# Patient Record
Sex: MALE | Race: ASIAN | NOT HISPANIC OR LATINO | URBAN - METROPOLITAN AREA
[De-identification: names, ages, dates, MRNs, and addresses within clinical notes are randomized per-mention and may not be internally consistent; named-entity substitution may affect disease eponyms.]

---

## 2022-01-20 ENCOUNTER — EMERGENCY (EMERGENCY)
Facility: HOSPITAL | Age: 27
LOS: 1 days | Discharge: HOME | End: 2022-01-20
Attending: EMERGENCY MEDICINE | Admitting: EMERGENCY MEDICINE
Payer: MEDICAID

## 2022-01-20 VITALS
DIASTOLIC BLOOD PRESSURE: 80 MMHG | SYSTOLIC BLOOD PRESSURE: 128 MMHG | HEART RATE: 80 BPM | RESPIRATION RATE: 16 BRPM | OXYGEN SATURATION: 100 % | TEMPERATURE: 98 F

## 2022-01-20 PROCEDURE — 73562 X-RAY EXAM OF KNEE 3: CPT | Mod: 26,RT

## 2022-01-20 PROCEDURE — 99284 EMERGENCY DEPT VISIT MOD MDM: CPT

## 2022-01-20 PROCEDURE — 73552 X-RAY EXAM OF FEMUR 2/>: CPT | Mod: 26,RT

## 2022-01-20 PROCEDURE — 73590 X-RAY EXAM OF LOWER LEG: CPT | Mod: 26,RT

## 2022-01-20 NOTE — ED PROVIDER NOTE - CARE PROVIDER_API CALL
Jason Juarez)  Orthopaedic Surgery  [address] [facility], [facility] [address]  [place] [address]  Phone: [phone]  Fax: [phone]  Follow Up Time:     Nik Christianson)  [address], [facility] [address]  [place] [address]  Phone: [phone]  Fax: [phone])-  Follow Up Time:     Anatoliy Quick)  Orthopedics  [address] [place] [id number]  Phone: [phone]  Fax: [phone]  Follow Up Time:

## 2022-01-20 NOTE — ED PROVIDER NOTE - PHYSICAL EXAMINATION
GEN - NAD; well appearing; A+O x3; non-toxic appearing  CARD -s1s2, RRR, no M,G,R;   PULM - CTA b/l, symmetric breath sounds;   ABD -  +BS, ND, NT, soft, no guarding, no rebound, no masses;   BACK - no CVA tenderness, Normal  spine;   EXT - symmetric pulses, 2+ dp, capillary refill < 2 seconds, no cyanosis.  R knee swollen, tender to palpation with patellar slightly higher compared to L.  Pulses noted distally with sensation intact.    NEURO - no focal neuro deficits, no slurred speech
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
## 2022-01-20 NOTE — ED PROVIDER NOTE - PROVIDER TOKENS
PROVIDER:[TOKEN:[[id number]]],PROVIDER:[TOKEN:[[id number]]],PROVIDER:[TOKEN:[[id number]]]
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
## 2022-01-20 NOTE — ED PROVIDER NOTE - CARE PROVIDERS DIRECT ADDRESSES
[email or web address],DirectAddress_Unknown

## 2022-01-20 NOTE — ED PROVIDER NOTE - DISCHARGE DATE
Quality 431: Preventive Care And Screening: Unhealthy Alcohol Use - Screening: Patient screened for unhealthy alcohol use using a single question and scores less than 2 times per year Quality 130: Documentation Of Current Medications In The Medical Record: Current Medications Documented Quality 226: Preventive Care And Screening: Tobacco Use: Screening And Cessation Intervention: Patient screened for tobacco use and is an ex/non-smoker Detail Level: Detailed 20-Jan-2022

## 2022-01-20 NOTE — ED PROVIDER NOTE - PATIENT PORTAL LINK FT
You can access the FollowMyHealth Patient Portal offered by [facility] by registering at the following website: [email or web address]. By joining [employer]’s FollowMyHealth portal, you will also be able to view your health information using other applications (apps) compatible with our system.

## 2022-01-20 NOTE — ED PROVIDER NOTE - PROGRESS NOTE DETAILS
Discussed with orthopedics who will see patient in the office.  Pt to be placed in knee immobilizer, non-weight bearing.  No acute surgical intervention required at this time.
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
## 2022-01-25 ENCOUNTER — EMERGENCY (EMERGENCY)
Facility: HOSPITAL | Age: 27
LOS: 1 days | Discharge: HOME | End: 2022-01-25
Attending: EMERGENCY MEDICINE | Admitting: EMERGENCY MEDICINE
Payer: MEDICAID

## 2022-01-25 VITALS
DIASTOLIC BLOOD PRESSURE: 78 MMHG | RESPIRATION RATE: 18 BRPM | OXYGEN SATURATION: 100 % | TEMPERATURE: 98 F | HEART RATE: 88 BPM | SYSTOLIC BLOOD PRESSURE: 128 MMHG

## 2022-01-25 PROBLEM — Z78.9 OTHER SPECIFIED HEALTH STATUS: Chronic | Status: ACTIVE | Noted: 2022-01-20

## 2022-01-25 LAB
ALBUMIN SERPL ELPH-MCNC: 4.6 G/DL — SIGNIFICANT CHANGE UP (ref 3.3–5)
ALP SERPL-CCNC: 67 U/L — SIGNIFICANT CHANGE UP (ref 40–120)
ALT FLD-CCNC: 20 U/L — SIGNIFICANT CHANGE UP (ref 4–41)
ANION GAP SERPL CALC-SCNC: 10 MMOL/L — SIGNIFICANT CHANGE UP (ref 7–14)
AST SERPL-CCNC: 23 U/L — SIGNIFICANT CHANGE UP (ref 4–40)
BASOPHILS # BLD AUTO: 0.04 K/UL — SIGNIFICANT CHANGE UP (ref 0–0.2)
BASOPHILS NFR BLD AUTO: 0.7 % — SIGNIFICANT CHANGE UP (ref 0–2)
BILIRUB SERPL-MCNC: 0.3 MG/DL — SIGNIFICANT CHANGE UP (ref 0.2–1.2)
BUN SERPL-MCNC: 16 MG/DL — SIGNIFICANT CHANGE UP (ref 7–23)
CALCIUM SERPL-MCNC: 9.7 MG/DL — SIGNIFICANT CHANGE UP (ref 8.4–10.5)
CHLORIDE SERPL-SCNC: 96 MMOL/L — LOW (ref 98–107)
CO2 SERPL-SCNC: 27 MMOL/L — SIGNIFICANT CHANGE UP (ref 22–31)
CREAT SERPL-MCNC: 1.05 MG/DL — SIGNIFICANT CHANGE UP (ref 0.5–1.3)
EOSINOPHIL # BLD AUTO: 0.23 K/UL — SIGNIFICANT CHANGE UP (ref 0–0.5)
EOSINOPHIL NFR BLD AUTO: 4.2 % — SIGNIFICANT CHANGE UP (ref 0–6)
GLUCOSE SERPL-MCNC: 101 MG/DL — HIGH (ref 70–99)
HCT VFR BLD CALC: 42.9 % — SIGNIFICANT CHANGE UP (ref 39–50)
HGB BLD-MCNC: 14.3 G/DL — SIGNIFICANT CHANGE UP (ref 13–17)
IANC: 3.06 K/UL — SIGNIFICANT CHANGE UP (ref 1.5–8.5)
IMM GRANULOCYTES NFR BLD AUTO: 0.2 % — SIGNIFICANT CHANGE UP (ref 0–1.5)
LYMPHOCYTES # BLD AUTO: 1.61 K/UL — SIGNIFICANT CHANGE UP (ref 1–3.3)
LYMPHOCYTES # BLD AUTO: 29.1 % — SIGNIFICANT CHANGE UP (ref 13–44)
MCHC RBC-ENTMCNC: 28.2 PG — SIGNIFICANT CHANGE UP (ref 27–34)
MCHC RBC-ENTMCNC: 33.3 GM/DL — SIGNIFICANT CHANGE UP (ref 32–36)
MCV RBC AUTO: 84.6 FL — SIGNIFICANT CHANGE UP (ref 80–100)
MONOCYTES # BLD AUTO: 0.58 K/UL — SIGNIFICANT CHANGE UP (ref 0–0.9)
MONOCYTES NFR BLD AUTO: 10.5 % — SIGNIFICANT CHANGE UP (ref 2–14)
NEUTROPHILS # BLD AUTO: 3.06 K/UL — SIGNIFICANT CHANGE UP (ref 1.8–7.4)
NEUTROPHILS NFR BLD AUTO: 55.3 % — SIGNIFICANT CHANGE UP (ref 43–77)
NRBC # BLD: 0 /100 WBCS — SIGNIFICANT CHANGE UP
NRBC # FLD: 0 K/UL — SIGNIFICANT CHANGE UP
PLATELET # BLD AUTO: 238 K/UL — SIGNIFICANT CHANGE UP (ref 150–400)
POTASSIUM SERPL-MCNC: 3.6 MMOL/L — SIGNIFICANT CHANGE UP (ref 3.5–5.3)
POTASSIUM SERPL-SCNC: 3.6 MMOL/L — SIGNIFICANT CHANGE UP (ref 3.5–5.3)
PROT SERPL-MCNC: 8.2 G/DL — SIGNIFICANT CHANGE UP (ref 6–8.3)
RBC # BLD: 5.07 M/UL — SIGNIFICANT CHANGE UP (ref 4.2–5.8)
RBC # FLD: 12.7 % — SIGNIFICANT CHANGE UP (ref 10.3–14.5)
SODIUM SERPL-SCNC: 133 MMOL/L — LOW (ref 135–145)
WBC # BLD: 5.53 K/UL — SIGNIFICANT CHANGE UP (ref 3.8–10.5)
WBC # FLD AUTO: 5.53 K/UL — SIGNIFICANT CHANGE UP (ref 3.8–10.5)

## 2022-01-25 PROCEDURE — 93971 EXTREMITY STUDY: CPT | Mod: 26,LT

## 2022-01-25 PROCEDURE — 99285 EMERGENCY DEPT VISIT HI MDM: CPT

## 2022-01-25 PROCEDURE — 73590 X-RAY EXAM OF LOWER LEG: CPT | Mod: 26,RT

## 2022-01-25 PROCEDURE — 73562 X-RAY EXAM OF KNEE 3: CPT | Mod: 26,RT

## 2022-01-25 RX ORDER — OXYCODONE HYDROCHLORIDE 5 MG/1
1 TABLET ORAL
Qty: 20 | Refills: 0
Start: 2022-01-25 | End: 2022-01-29

## 2022-01-25 RX ORDER — MORPHINE SULFATE 50 MG/1
4 CAPSULE, EXTENDED RELEASE ORAL ONCE
Refills: 0 | Status: DISCONTINUED | OUTPATIENT
Start: 2022-01-25 | End: 2022-01-25

## 2022-01-25 RX ORDER — SODIUM CHLORIDE 9 MG/ML
1000 INJECTION INTRAMUSCULAR; INTRAVENOUS; SUBCUTANEOUS ONCE
Refills: 0 | Status: COMPLETED | OUTPATIENT
Start: 2022-01-25 | End: 2022-01-25

## 2022-01-25 RX ADMIN — MORPHINE SULFATE 4 MILLIGRAM(S): 50 CAPSULE, EXTENDED RELEASE ORAL at 03:48

## 2022-01-25 RX ADMIN — SODIUM CHLORIDE 1000 MILLILITER(S): 9 INJECTION INTRAMUSCULAR; INTRAVENOUS; SUBCUTANEOUS at 03:48
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
## 2022-01-25 NOTE — ED PROVIDER NOTE - PATIENT PORTAL LINK FT
You can access the FollowMyHealth Patient Portal offered by [facility] by registering at the following website: [email or web address]. By joining [employer]’s FollowMyHealth portal, you will also be able to view your health information using other applications (apps) compatible with our system.

## 2022-01-25 NOTE — ED PROVIDER NOTE - PHYSICAL EXAMINATION
Gen: NAD, AAOx3, non-toxic appearing.  HEENT: NCAT, normal conjunctiva, oral mucosa moist.  Lung: speaking in full sentences, good aeration bilaterally, lungs CTA b/l.  CV: regular rate and rhythm. cap refill <2x. peripheral pulses 2+bilaterally.   Abd: soft, ND, NT.  MSK: Diffusely tender R knee joint with nonpitting edema extending from R knee to forefoot. Sensation intact. Unable to palpate DP pulse. No skin changes, toes are not cool to touch. Range of motion of knee and ankle joint limited due to pain.   Neuro: No focal deficits.  Skin: Intact.  Psych: normal affect.

## 2022-01-25 NOTE — ED PROVIDER NOTE - PROGRESS NOTE DETAILS
Paulino, PGY2: patient reassessed, reports moderate improvement of pain. dvt study limited due to patient pain, no obvious clot. Paulino, PGY2: spoke with ortho resident. was able to palpate 2+ DP and PT pulses bilaterally. recommending ortho f/u for surgical planning. do not suspect compartment syndrome. ultrasound limited due to pain, will recommend PMD f/u for repeat study. patient in agreement with plan.
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
## 2022-01-25 NOTE — ED PROVIDER NOTE - NSFOLLOWUPINSTRUCTIONS_ED_ALL_ED_FT
You were evaluated in the Emergency Department for LEG PAIN.      There are no signs of emergency conditions requiring admission to the hospital on today's workup.      We recommend that you see a primary care physician (Call [phone]) within the next 3 days for follow up and for a repeat of the ULTRASOUND VENOUS DUPLEX to ensure that there is no clot in your leg.     We recommend that you see the ORTHOPEDIC DOCTOR within the next 3 days for follow up and for a repeat of the ULTRASOUND VENOUS DUPLEX to ensure that there is no clot in your leg.     For pain you can take ibuprofen (Motrin, Advil) or acetaminophen (Tylenol) as needed, as directed on packaging. For severe pain you may take the oxycodone which was sent to your pharmacy as prescribed.     ***Return to the emergency department if you have any other new/concerning symptoms, including but not limited to: WORSENING PAIN, NUMBNESS/TINGLING, WORSENING SWELLING***
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
## 2023-12-29 ENCOUNTER — EMERGENCY (EMERGENCY)
Facility: HOSPITAL | Age: 28
LOS: 1 days | Discharge: HOME | End: 2023-12-29
Admitting: STUDENT IN AN ORGANIZED HEALTH CARE EDUCATION/TRAINING PROGRAM
Payer: MEDICAID

## 2023-12-29 VITALS
SYSTOLIC BLOOD PRESSURE: 137 MMHG | RESPIRATION RATE: 16 BRPM | TEMPERATURE: 98 F | HEART RATE: 71 BPM | DIASTOLIC BLOOD PRESSURE: 80 MMHG | OXYGEN SATURATION: 100 %

## 2023-12-29 PROCEDURE — 99284 EMERGENCY DEPT VISIT MOD MDM: CPT

## 2023-12-29 RX ORDER — TETANUS TOXOID, REDUCED DIPHTHERIA TOXOID AND ACELLULAR PERTUSSIS VACCINE, ADSORBED 5; 2.5; 8; 8; 2.5 [IU]/.5ML; [IU]/.5ML; UG/.5ML; UG/.5ML; UG/.5ML
0.5 SUSPENSION INTRAMUSCULAR ONCE
Refills: 0 | Status: COMPLETED | OUTPATIENT
Start: 2023-12-29 | End: 2023-12-29

## 2023-12-29 RX ADMIN — Medication 1 TABLET(S): at 10:11

## 2023-12-29 RX ADMIN — TETANUS TOXOID, REDUCED DIPHTHERIA TOXOID AND ACELLULAR PERTUSSIS VACCINE, ADSORBED 0.5 MILLILITER(S): 5; 2.5; 8; 8; 2.5 SUSPENSION INTRAMUSCULAR at 10:11

## 2023-12-29 NOTE — ED PROVIDER NOTE - OBJECTIVE STATEMENT
27 y/o male with no pmhx presents to ED c/o left big toe animal bite today. Pt states he got bit by a mouse in his house. States his family members have also gotten bit over the last few months. Reported to his super. Pt reports break in skin no active bleeding at this time. Last tetanus unknown. No fevers, chills, pain, weakness, numbness, tingling.

## 2023-12-29 NOTE — ED PROVIDER NOTE - CLINICAL SUMMARY MEDICAL DECISION MAKING FREE TEXT BOX
29 y/o male with no pmhx presents to ED c/o left big toe animal bite today. Pt states he got bit by a mouse in his house. States his family members have also gotten bit over the last few months. Reported to his super. Pt reports break in skin no active bleeding at this time. Last tetanus unknown. pt with superficial puncture wound. plan to provide augmentin and update tetanus shot. 27 y/o male with no pmhx presents to ED c/o left big toe animal bite today. Pt states he got bit by a mouse in his house. States his family members have also gotten bit over the last few months. Reported to his super. Pt reports break in skin no active bleeding at this time. Last tetanus unknown. pt with superficial puncture wound. plan to provide augmentin and update tetanus shot.

## 2023-12-29 NOTE — ED ADULT NURSE NOTE - OBJECTIVE STATEMENT
Patient received to RW Zn 10. Aox4, ambulatory with steady gait. States he was sleeping and was bit by a rat this morning on his Left 1st Toe. No bleeding observed. Tiny superficial scratch observed to toe. Pt states he feels pain 8/10 at the scratch site. Pt has steady gait, no issues with ambulating. Stated the rat crawled on his face also and then he swatted it away. No injuries observed to face or head. Hx: Asthma. Denies SOB, or any other medical issues at this time. Medicated with antibx and Adacel per MD. Pt to be d/c.

## 2023-12-29 NOTE — ED PROVIDER NOTE - NSFOLLOWUPINSTRUCTIONS_ED_ALL_ED_FT
Take Augmentin as prescribed.  Keep wound clean, wash with soap and water.    Worsening, continued or new concerning symptoms, fever, chills, redness, swelling, return to the emergency department.

## 2023-12-29 NOTE — ED PROVIDER NOTE - SKIN, MLM
Skin normal color for race, warm, dry and intact. No evidence of rash. Puncture noted to plantar aspect of left big toe.

## 2023-12-29 NOTE — ED ADULT NURSE NOTE - NSFALLUNIVINTERV_ED_ALL_ED
Bed/Stretcher in lowest position, wheels locked, appropriate side rails in place/Call bell, personal items and telephone in reach/Instruct patient to call for assistance before getting out of bed/chair/stretcher/Non-slip footwear applied when patient is off stretcher/[place] to call system/Physically safe environment - no spills, clutter or unnecessary equipment/Purposeful proactive rounding/Room/bathroom lighting operational, light cord in reach Bed/Stretcher in lowest position, wheels locked, appropriate side rails in place/Call bell, personal items and telephone in reach/Instruct patient to call for assistance before getting out of bed/chair/stretcher/Non-slip footwear applied when patient is off stretcher/[place] to call system/Physically safe environment - no spills, clutter or unnecessary equipment/Purposeful proactive rounding/Room/bathroom lighting operational, light cord in reach

## 2023-12-29 NOTE — ED ADULT TRIAGE NOTE - CHIEF COMPLAINT QUOTE
pt ambulatory to walk in triage c/o left great to pain after being bitten by rat at 6am, pt states he may have also been scratched on forehead, no marks noted on forehead. med hx asthma, hold he has high blood sugar

## 2024-01-27 ENCOUNTER — EMERGENCY (EMERGENCY)
Facility: HOSPITAL | Age: 29
LOS: 1 days | Discharge: HOME | End: 2024-01-27
Admitting: EMERGENCY MEDICINE
Payer: MEDICAID

## 2024-01-27 VITALS
DIASTOLIC BLOOD PRESSURE: 78 MMHG | HEART RATE: 84 BPM | TEMPERATURE: 98 F | RESPIRATION RATE: 17 BRPM | SYSTOLIC BLOOD PRESSURE: 119 MMHG | OXYGEN SATURATION: 96 %

## 2024-01-27 PROCEDURE — 99284 EMERGENCY DEPT VISIT MOD MDM: CPT

## 2024-01-27 PROCEDURE — 72170 X-RAY EXAM OF PELVIS: CPT | Mod: 26

## 2024-01-27 PROCEDURE — 73552 X-RAY EXAM OF FEMUR 2/>: CPT | Mod: 26,LT

## 2024-01-27 RX ORDER — CYCLOBENZAPRINE HYDROCHLORIDE 10 MG/1
5 TABLET, FILM COATED ORAL ONCE
Refills: 0 | Status: COMPLETED | OUTPATIENT
Start: 2024-01-27 | End: 2024-01-27

## 2024-01-27 RX ORDER — LIDOCAINE 4 G/100G
1 CREAM TOPICAL ONCE
Refills: 0 | Status: COMPLETED | OUTPATIENT
Start: 2024-01-27 | End: 2024-01-27

## 2024-01-27 RX ORDER — CYCLOBENZAPRINE HYDROCHLORIDE 10 MG/1
1 TABLET, FILM COATED ORAL
Qty: 15 | Refills: 0
Start: 2024-01-27 | End: 2024-01-31

## 2024-01-27 RX ORDER — IBUPROFEN 200 MG
400 TABLET ORAL ONCE
Refills: 0 | Status: COMPLETED | OUTPATIENT
Start: 2024-01-27 | End: 2024-01-27

## 2024-01-27 RX ORDER — IBUPROFEN 200 MG
1 TABLET ORAL
Qty: 15 | Refills: 0
Start: 2024-01-27 | End: 2024-01-31

## 2024-01-27 RX ADMIN — Medication 400 MILLIGRAM(S): at 19:04

## 2024-01-27 RX ADMIN — LIDOCAINE 1 PATCH: 4 CREAM TOPICAL at 19:04

## 2024-01-27 RX ADMIN — CYCLOBENZAPRINE HYDROCHLORIDE 5 MILLIGRAM(S): 10 TABLET, FILM COATED ORAL at 19:04
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
## 2024-01-27 NOTE — ED PROVIDER NOTE - PHYSICAL EXAMINATION
-Cranial Nerves:  --CN II: PERRLA  --CN III, IV, VI: EOMI b/l  --CN V1-3: Facial sensation intact to touch  --CN VII: No facial asymmetry or droop  --CN VIII: Hearing intact to rubbing fingers b/l  --CN IX, X: Palate elevates symmetrically. Normal phonation  --CN XI: Heading turning and shoulder shrug intact b/l  --CN XII: Tongue midline with normal movements     Normal bilateral FTN. Rapid alternating movements intact.  Negative rhomberg.     Bilateral UE and LE:  5/5 strength; sensation intact to light touch; < 2 sec cap refill; 2+ pulses; no swelling; no crepitus; no joint laxity; no palpable cords

## 2024-01-27 NOTE — ED ADULT NURSE REASSESSMENT NOTE - NS ED NURSE REASSESS COMMENT FT1
Report received from CHARAN Hargrove. Pt in wellness waiting area. Well appearing sitting up in chair. Respirations even and unlabored. No acute distress noted. Speaking in full and complete sentences. Safety maintained. Awaiting further orders.

## 2024-01-27 NOTE — ED PROVIDER NOTE - PATIENT PORTAL LINK FT
You can access the FollowMyHealth Patient Portal offered by [facility] by registering at the following website: [email or web address]. By joining [employer]’s FollowMyHealth portal, you will also be able to view your health information using other applications (apps) compatible with our system.

## 2024-01-27 NOTE — ED PROVIDER NOTE - PROGRESS NOTE DETAILS
CONCHITA URBANO:  Pt ambulatory without assistance.  XRay results nonactionable.  Pt medically stable for discharge.  Strict return precautions given.  Pt to follow up with PMD and ortho (referral list provided).
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
## 2024-01-27 NOTE — ED PROVIDER NOTE - NSFOLLOWUPINSTRUCTIONS_ED_ALL_ED_FT
Advance activity as tolerated.  Continue all previously prescribed medications as directed unless otherwise instructed.  Take Motrin 600 mg every 8 hours as needed for moderate pain or fevers -- take with food.  Take Flexeril 5 mg every 8 hours as needed for muscle spasm -- causes drowsiness; no drinking alcohol or driving with this medication.  Apply lidocaine patch to affected area; this is available over the counter, follow instructions on its packaging.  Follow up with your primary care physician and orthopedics (referral list provided or call [phone] to make an appointment with the orthopedics clinic)  in 48-72 hours- bring copies of your results.  Return to the ER for worsening or persistent symptoms, including but not limited to worsening/persistent pain, numbness, weakness, difficulty urinating, difficulty passing bowel movements, incontinence, difficulty walking, falls, abdominal pain, chest pain, fevers, and/or ANY NEW OR CONCERNING SYMPTOMS. If you have issues obtaining follow up, please call: [phone] to obtain a doctor or specialist who takes your insurance in your area.  You may call [phone] to make an appointment with the internal medicine clinic.      LUMBAR RADICULOPATHY - General Information    Lumbar Radiculopathy    WHAT YOU NEED TO KNOW:    What is lumbar radiculopathy? Lumbar radiculopathy is a painful condition that happens when a nerve in your lumbar spine (lower back) is pinched or irritated.  Vertebral Column    What causes lumbar radiculopathy? You may get a pinched nerve in your lumbar spine if you have disc damage. Discs are natural, spongy cushions between your vertebrae (back bones) that allow your spine to move. Your discs may move out of place and pinch the nerve in your spine. Any of the following can increase your risk for a pinched nerve and lumbar radiculopathy:    Diabetes, a spinal infection, or a growth in your spine    Extra body weight    Being male or an older adult    Cigarette use  What are the signs and symptoms of lumbar radiculopathy? You may have any of the following:    Pain that moves from your lower back to your buttocks, groin, and the back of your leg    Pain felt below your knee    Pain that worsens when you cough, sneeze, stand, or sit    Numbness, weakness, or tingling in your back or legs  How is lumbar radiculopathy diagnosed? Your healthcare provider will examine you and ask about your family history of back and leg pain. Your provider may also test you for weakness, numbness, or tingling in your back, buttocks, and legs. You may be asked to lie on your back and lift your leg to locate your pain. You may also need any of the following:    MRI or CT scan pictures may show problems or changes in your back bones, nerves, and discs. Contrast liquid may be used to help problems show up better in the pictures. Tell the healthcare provider if you have ever had an allergic reaction to contrast liquid. Do not enter the MRI room with anything metal. Metal can cause serious injury. Tell the healthcare provider if you have any metal in or on your body.    X-ray pictures show signs of infection or other problems with your spine.    An electromyography (EMG) test measures the electrical activity of your muscles at rest and with movement.  How is lumbar radiculopathy treated? Ask your healthcare provider for more information about these and other treatments for lumbar radiculopathy:    Medicines:  NSAIDs, such as ibuprofen, help decrease swelling, pain, and fever. This medicine is available with or without a doctor's order. NSAIDs can cause stomach bleeding or kidney problems in certain people. If you take blood thinner medicine, always ask your healthcare provider if NSAIDs are safe for you. Always read the medicine label and follow directions.    Muscle relaxers help decrease pain and muscle spasms.    Prescription pain medicine may be given. Ask your healthcare provider how to take this medicine safely. Some prescription pain medicines contain acetaminophen. Do not take other medicines that contain acetaminophen without talking to your healthcare provider. Too much acetaminophen may cause liver damage. Prescription pain medicine may cause constipation. Ask your healthcare provider how to prevent or treat constipation.    Steroid pills may help reduce swelling and pain.    Steroid injections into your lumbar spine may help decrease your nerve pain and swelling. You may need more than 1 injection if your symptoms do not improve after the first treatment.    Physical therapy may be used to improve your posture (the way you stand and sit), flexibility, and the strength in your lower back. Your physical therapist may also teach you how to remain safely active and prevent more injury.    Transcutaneous electrical nerve stimulation (TENS) stimulates nerves and may decrease your pain. Wires are attached to pads. The pads are attached to your skin. The wires send a mild current through your nerves.    Surgery may relieve your pinched nerve if your condition has not improved within 4 to 6 weeks. You may also need surgery if you have lumbar radiculopathy more than 1 time.  What can I do to manage or prevent lumbar radiculopathy?    Apply ice or heat. Ice and heat can help relieve pain or swelling. Put ice in a plastic bag covered with a towel on your low back. Apply ice for 15 to 20 minutes at a time, or as directed. Cover heated items with a towel to avoid burns. You can also use a heating pad on a low setting. Apply heat for 20 minutes at a time. Your provider may tell you to alternate ice and heat.    Stay active. Your healthcare provider may tell you to take walks to ease yourself back into your daily routine. Avoid long periods of bed rest. Bed rest could worsen your symptoms. Do not move in ways that increase your pain. Ask your healthcare provider for more information about the best ways to stay active.  [employer] Walking for Exercise      Do not lift anything heavy. Heavy objects put a strain on your back and may make your symptoms worse.    Maintain a healthy weight. Extra body weight may strain your back. Ask your provider what a healthy weight is for you. Your provider can help you create a safe weight loss plan, if needed.    Do not smoke. Nicotine and other chemicals in cigarettes and cigars increase your risk for lumbar radiculopathy. Ask your provider for information if you currently smoke and need help to quit. E-cigarettes and smokeless tobacco still contain nicotine. Talk to your healthcare provider before you use these products.  When should I seek immediate care?    You have a fever greater than 100.4°F (38°C) for longer than 2 days.    You have new, severe back or leg pain, or your pain spreads to both legs.    You have any new signs of numbness or weakness, especially in your lower back, legs, arms, or genital area.    You have new trouble controlling your urine and bowel movements.    You do not feel like your bladder empties when you urinate.  When should I call my doctor?    Your pain does not improve within 1 to 3 weeks of treatment.    Your pain and weakness keep you from your normal activities at work, home, or school.    You lose more than 10 pounds in 6 months without trying.    You become depressed or sad because of the pain.    You have questions or concerns about your condition or care.  CARE AGREEMENT:    You have the right to help plan your care. Learn about your health condition and how it may be treated. Discuss treatment options with your healthcare providers to decide what care you want to receive. You always have the right to refuse treatment.    © [employer] 1973, 2024

## 2024-01-27 NOTE — ED ADULT NURSE NOTE - OBJECTIVE STATEMENT
Patient A&o X4, received in wellness , with complaints of left hip pain. Patient states, "I have been having left hip pain for the past 6 days". Patient reports that pain has been getting worse, now radiating down to left leg, Limited ROM noted in left foot. Patient rates pain at 10 out of 10 currently, denies taking any medication pta. Patient denies any recent falls or trauma to left hip. Patient able to speak in clear sentences, respirations equal and unlabored. Lung sounds clear b/l, equal chest rise and fall noted. Denies CP/SOB, fever, chills, nausea, vomiting and diarrhea at this time. Skin warm and dry. Provider at bedside for eval, pending further orders.

## 2024-06-21 ENCOUNTER — EMERGENCY (EMERGENCY)
Facility: HOSPITAL | Age: 29
LOS: 1 days | Discharge: HOME | End: 2024-06-21
Admitting: EMERGENCY MEDICINE
Payer: MEDICAID

## 2024-06-21 VITALS
SYSTOLIC BLOOD PRESSURE: 113 MMHG | DIASTOLIC BLOOD PRESSURE: 73 MMHG | HEART RATE: 76 BPM | OXYGEN SATURATION: 96 % | RESPIRATION RATE: 20 BRPM | WEIGHT: 184.97 LBS | TEMPERATURE: 98 F

## 2024-06-21 PROCEDURE — 73502 X-RAY EXAM HIP UNI 2-3 VIEWS: CPT | Mod: 26,RT

## 2024-06-21 PROCEDURE — 99284 EMERGENCY DEPT VISIT MOD MDM: CPT

## 2024-06-21 RX ORDER — CYCLOBENZAPRINE HYDROCHLORIDE 10 MG/1
10 TABLET, FILM COATED ORAL ONCE
Refills: 0 | Status: COMPLETED | OUTPATIENT
Start: 2024-06-21 | End: 2024-06-21

## 2024-06-21 RX ORDER — IBUPROFEN 200 MG
1 TABLET ORAL
Qty: 15 | Refills: 0
Start: 2024-06-21 | End: 2024-06-25

## 2024-06-21 RX ORDER — CYCLOBENZAPRINE HYDROCHLORIDE 10 MG/1
1 TABLET, FILM COATED ORAL
Qty: 9 | Refills: 0
Start: 2024-06-21 | End: 2024-06-23

## 2024-06-21 RX ORDER — LIDOCAINE 4 G/100G
1 CREAM TOPICAL ONCE
Refills: 0 | Status: COMPLETED | OUTPATIENT
Start: 2024-06-21 | End: 2024-06-21

## 2024-06-21 RX ORDER — IBUPROFEN 200 MG
600 TABLET ORAL ONCE
Refills: 0 | Status: COMPLETED | OUTPATIENT
Start: 2024-06-21 | End: 2024-06-21

## 2024-06-21 RX ORDER — ACETAMINOPHEN 500 MG
975 TABLET ORAL ONCE
Refills: 0 | Status: COMPLETED | OUTPATIENT
Start: 2024-06-21 | End: 2024-06-21

## 2024-06-21 RX ADMIN — Medication 600 MILLIGRAM(S): at 18:23

## 2024-06-21 RX ADMIN — LIDOCAINE 1 PATCH: 4 CREAM TOPICAL at 18:24

## 2024-06-21 RX ADMIN — Medication 975 MILLIGRAM(S): at 18:22

## 2024-06-21 RX ADMIN — CYCLOBENZAPRINE HYDROCHLORIDE 10 MILLIGRAM(S): 10 TABLET, FILM COATED ORAL at 18:23

## 2024-06-21 NOTE — ED PROVIDER NOTE - CLINICAL SUMMARY MEDICAL DECISION MAKING FREE TEXT BOX
pt with rt hip and lower back pain and radiculopathy to rle  -xray rt hip, pain control, likely ortho f/u

## 2024-06-21 NOTE — ED ADULT NURSE NOTE - NSFALLUNIVINTERV_ED_ALL_ED
Bed/Stretcher in lowest position, wheels locked, appropriate side rails in place/Call bell, personal items and telephone in reach/Instruct patient to call for assistance before getting out of bed/chair/stretcher/Non-slip footwear applied when patient is off stretcher/[place] to call system/Physically safe environment - no spills, clutter or unnecessary equipment/Purposeful proactive rounding/Room/bathroom lighting operational, light cord in reach

## 2024-06-21 NOTE — ED ADULT TRIAGE NOTE - CHIEF COMPLAINT QUOTE
Patient complains of right hip pain radiating to right knee x3 days now. Patient denies any recent falls or injury to hip. Patient denies any CP or SOB, respirations equal and unlabored on room air. No pertinent medical history.

## 2024-06-21 NOTE — ED PROVIDER NOTE - CONDITION AT DISCHARGE:
How Severe Is Your Skin Lesion?: mild Has Your Skin Lesion Been Treated?: not been treated Is This A New Presentation, Or A Follow-Up?: Skin Lesion Satisfactory

## 2024-06-21 NOTE — ED PROVIDER NOTE - NSFOLLOWUPINSTRUCTIONS_ED_ALL_ED_FT
Follow with your PMD within 48-72 hours.  Rest, no heavy lifting.  Warm compresses to area. Recommend Ortho consult to discuss possible MRI vs Physical Therapy- referral list provided.  Light walking. Take Motrin 600 mg every 8 hours for pain with food, Alternate with Tylenol 500mg 1-2 tabs every 6 hours as needed for pain. Take cyclobenzaprine 10mg every 8 hours as needed for muscle spasm- caution drowsiness/do not drive. Any worsening pain, weakness, numbness, bowel or urinary incontinence return to ER

## 2024-06-21 NOTE — ED PROVIDER NOTE - PATIENT PORTAL LINK FT
You can access the FollowMyHealth Patient Portal offered by [facility] by registering at the following website: [email or web address]. By joining [facility] FollowMyHealth portal, you will also be able to view your health information using other applications (apps) compatible with our system.

## 2024-06-21 NOTE — ED PROVIDER NOTE - OBJECTIVE STATEMENT
28 y/o M no PMH c/o right hip pain that radiates down rle and wraps around to right lower back x 1 week. Pt took tylenol for pain without relief 2-3 days ago. Denies fever, chills, CP, SOB, abdominal pain, N/V, numbness/tingling/weakness, bladder/bowel incontinence.

## 2024-06-21 NOTE — ED PROVIDER NOTE - EXTREMITY EXAM
mild ttp to bony hip, no erythmea, swelling, or deformity, no saddle anesthesia, from of hip and knee with reproducltion of pain in hip ROM, normal sensation and strength intact distally, ambulating with slight antalgic gait/right lower extremity findings

## 2024-06-21 NOTE — ED ADULT NURSE NOTE - OBJECTIVE STATEMENT
pt received to [email or web address]&[email or web address] and ambulatory at baseline, R hip pain radiating to right knee x3 days now. denies any trauma/injury, chest pain, sob. no acute distress noted at this time. respirations even and nonlabored on room air. comfort and safety maintained. medicated as ordered. pt pending XR